# Patient Record
Sex: FEMALE | Race: WHITE | NOT HISPANIC OR LATINO | Employment: OTHER | URBAN - METROPOLITAN AREA
[De-identification: names, ages, dates, MRNs, and addresses within clinical notes are randomized per-mention and may not be internally consistent; named-entity substitution may affect disease eponyms.]

---

## 2017-11-01 ENCOUNTER — HISTORICAL (OUTPATIENT)
Dept: ADMINISTRATIVE | Facility: HOSPITAL | Age: 72
End: 2017-11-01

## 2017-12-19 ENCOUNTER — HISTORICAL (OUTPATIENT)
Dept: RADIOLOGY | Facility: HOSPITAL | Age: 72
End: 2017-12-19

## 2018-01-15 ENCOUNTER — HISTORICAL (OUTPATIENT)
Dept: LAB | Facility: HOSPITAL | Age: 73
End: 2018-01-15

## 2018-01-15 ENCOUNTER — HISTORICAL (OUTPATIENT)
Dept: PREADMISSION TESTING | Facility: HOSPITAL | Age: 73
End: 2018-01-15

## 2018-01-15 LAB
ABS NEUT (OLG): 4.84 X10(3)/MCL (ref 2.1–9.2)
ALBUMIN SERPL-MCNC: 2.8 GM/DL (ref 3.4–5)
ALBUMIN/GLOB SERPL: 0.5 RATIO (ref 1.1–2)
ALP SERPL-CCNC: 126 UNIT/L (ref 38–126)
ALT SERPL-CCNC: 32 UNIT/L (ref 12–78)
APPEARANCE, UA: CLEAR
APTT PPP: 28.3 SECOND(S) (ref 24.8–36.9)
AST SERPL-CCNC: 36 UNIT/L (ref 15–37)
BACTERIA SPEC CULT: ABNORMAL /HPF
BASOPHILS # BLD AUTO: 0.1 X10(3)/MCL (ref 0–0.2)
BASOPHILS NFR BLD AUTO: 1 %
BILIRUB SERPL-MCNC: 0.4 MG/DL (ref 0.2–1)
BILIRUB UR QL STRIP: ABNORMAL
BILIRUBIN DIRECT+TOT PNL SERPL-MCNC: 0.1 MG/DL (ref 0–0.5)
BILIRUBIN DIRECT+TOT PNL SERPL-MCNC: 0.3 MG/DL (ref 0–0.8)
BUN SERPL-MCNC: 24 MG/DL (ref 7–18)
CALCIUM SERPL-MCNC: 8.9 MG/DL (ref 8.5–10.1)
CHLORIDE SERPL-SCNC: 99 MMOL/L (ref 98–107)
CHOLEST SERPL-MCNC: 136 MG/DL (ref 0–200)
CHOLEST/HDLC SERPL: 2.8 {RATIO} (ref 0–4)
CO2 SERPL-SCNC: 30 MMOL/L (ref 21–32)
COLOR UR: ABNORMAL
CREAT SERPL-MCNC: 0.88 MG/DL (ref 0.55–1.02)
EOSINOPHIL # BLD AUTO: 0.3 X10(3)/MCL (ref 0–0.9)
EOSINOPHIL NFR BLD AUTO: 3 %
ERYTHROCYTE [DISTWIDTH] IN BLOOD BY AUTOMATED COUNT: 14.3 % (ref 11.5–17)
EST. AVERAGE GLUCOSE BLD GHB EST-MCNC: 169 MG/DL
FT4I SERPL CALC-MCNC: 3.44 (ref 2.6–3.6)
GLOBULIN SER-MCNC: 6.2 GM/DL (ref 2.4–3.5)
GLUCOSE (UA): NEGATIVE
GLUCOSE SERPL-MCNC: 219 MG/DL (ref 74–106)
HBA1C MFR BLD: 7.5 % (ref 4.2–6.3)
HCT VFR BLD AUTO: 46.1 % (ref 37–47)
HDLC SERPL-MCNC: 49 MG/DL (ref 35–60)
HGB BLD-MCNC: 14.7 GM/DL (ref 12–16)
HGB UR QL STRIP: NEGATIVE
INR PPP: 0.96 (ref 0–1.27)
KETONES UR QL STRIP: NEGATIVE
LDLC SERPL CALC-MCNC: 30 MG/DL (ref 0–129)
LEUKOCYTE ESTERASE UR QL STRIP: ABNORMAL
LYMPHOCYTES # BLD AUTO: 2.9 X10(3)/MCL (ref 0.6–4.6)
LYMPHOCYTES NFR BLD AUTO: 33 %
MCH RBC QN AUTO: 28.1 PG (ref 27–31)
MCHC RBC AUTO-ENTMCNC: 31.9 GM/DL (ref 33–36)
MCV RBC AUTO: 88.1 FL (ref 80–94)
MONOCYTES # BLD AUTO: 0.7 X10(3)/MCL (ref 0.1–1.3)
MONOCYTES NFR BLD AUTO: 8 %
MRSA SCREEN BY PCR: NEGATIVE
NEUTROPHILS # BLD AUTO: 4.84 X10(3)/MCL (ref 1.4–7.9)
NEUTROPHILS NFR BLD AUTO: 55 %
NITRITE UR QL STRIP: NEGATIVE
PH UR STRIP: 6 [PH] (ref 5–9)
PLATELET # BLD AUTO: 296 X10(3)/MCL (ref 130–400)
PMV BLD AUTO: 11.6 FL (ref 9.4–12.4)
POTASSIUM SERPL-SCNC: 3.5 MMOL/L (ref 3.5–5.1)
PROT SERPL-MCNC: 9 GM/DL (ref 6.4–8.2)
PROT UR QL STRIP: ABNORMAL
PROTHROMBIN TIME: 13.1 SECOND(S) (ref 12.2–14.7)
RBC # BLD AUTO: 5.23 X10(6)/MCL (ref 4.2–5.4)
RBC #/AREA URNS HPF: ABNORMAL /[HPF]
SODIUM SERPL-SCNC: 130 MMOL/L (ref 136–145)
SP GR UR STRIP: 1.03 (ref 1–1.03)
SQUAMOUS EPITHELIAL, UA: ABNORMAL
T3RU NFR SERPL: 28 % (ref 31–39)
T4 SERPL-MCNC: 12.3 MCG/DL (ref 4.7–13.3)
TRANSFERRIN SERPL-MCNC: 405 MG/DL (ref 200–360)
TRIGL SERPL-MCNC: 287 MG/DL (ref 30–150)
TSH SERPL-ACNC: 3.4 MIU/ML (ref 0.36–3.74)
UROBILINOGEN UR STRIP-ACNC: 1
VLDLC SERPL CALC-MCNC: 57 MG/DL
WBC # SPEC AUTO: 8.8 X10(3)/MCL (ref 4.5–11.5)
WBC #/AREA URNS HPF: ABNORMAL /[HPF]

## 2018-01-17 LAB — FINAL CULTURE: NO GROWTH

## 2018-12-10 ENCOUNTER — HISTORICAL (OUTPATIENT)
Dept: ADMINISTRATIVE | Facility: HOSPITAL | Age: 73
End: 2018-12-10

## 2019-03-18 ENCOUNTER — HISTORICAL (OUTPATIENT)
Dept: ADMINISTRATIVE | Facility: HOSPITAL | Age: 74
End: 2019-03-18

## 2020-02-11 ENCOUNTER — HISTORICAL (OUTPATIENT)
Dept: ADMINISTRATIVE | Facility: HOSPITAL | Age: 75
End: 2020-02-11

## 2020-05-13 ENCOUNTER — HISTORICAL (OUTPATIENT)
Dept: ADMINISTRATIVE | Facility: HOSPITAL | Age: 75
End: 2020-05-13

## 2021-03-03 ENCOUNTER — HISTORICAL (OUTPATIENT)
Dept: ADMINISTRATIVE | Facility: HOSPITAL | Age: 76
End: 2021-03-03

## 2022-04-10 ENCOUNTER — HISTORICAL (OUTPATIENT)
Dept: ADMINISTRATIVE | Facility: HOSPITAL | Age: 77
End: 2022-04-10

## 2022-04-24 VITALS
DIASTOLIC BLOOD PRESSURE: 81 MMHG | BODY MASS INDEX: 38.35 KG/M2 | WEIGHT: 224.63 LBS | SYSTOLIC BLOOD PRESSURE: 142 MMHG | HEIGHT: 64 IN

## 2022-05-04 NOTE — HISTORICAL [facility]
This is a historical note converted from Fabiana. Formatting and pictures may have been removed.  Please reference Fabiana for original formatting and attached multimedia. Chief Complaint  New patient here wanting to discuss TKR. Xrays today. Overtime pain. Ref Dr. Mendez.  History of Present Illness  She is a pleasant 72-year-old female whose had a long-standing history of?bilateral knee pain but right greater than left. ?The pain in her knee is localized medially but also somewhat global. ?Is worse?with ambulation. ?Somewhat better with rest. ?She is taken?her prescription strength anti-inflammatory medicines. ?Is begin to impact her daily activities.? She has had 3 injections this past year the last of which was in October 2017.? When she is not?responding to injection she will use a cane to ambulate.  Review of Systems  Comprehensive review of system?was performed with no exceptions other than noted in the history of present illness  Physical Exam  Vitals & Measurements  BP:?138/62?  HT:?162?cm? HT:?162?cm? HT:?162?cm? WT:?98?kg? WT:?98?kg? WT:?98?kg? BMI:?37.34?  Gen: WN, WD, NAD  Card/Res: NL breathing, +distal pulses  Abdomen: ND  Standing exam  stance:?Varus alignment, no significant leg-length discrepancy  gait:?Antalgic limp  ?   Knee examination  - General comments: unremarkable appearance  ?   - Tenderness: Medial joint line  ?   Knee??????????RIGHT??????????LEFT  Skin: ??????????Intact ??????????Intact  ROM:??????????20-90??????????0-130  Effusion:??????+????????????? Neg  MJL TTP:??????+????????????? Neg  LJL TTP: ?????? Neg ???????????? Neg  Paloma:???? +????????????? Neg  Pat crep:????????+????????????????Neg  Patella TTPs:? +????????????????Neg  Patella grind: Neg??????????? ?Neg  ?   N-V ????????????????????intact??????????intact  Hip:?????????????????????????nml?????????? nml  ?   Lower extremity edema:Negative  ?  Assessment/Plan  1.?Osteoarthritis of right knee  ? I think she would benefit  from a right total knee arthroplasty. ?We discussed with her briefly the?procedure details and the expected postoperative course.? We will send her to the arthroplasty class. ?I will obtain a CT scan of her right leg for a robotic guidance?in mid December. ?I will see her back in early January for plans for surgical intervention in mid to late January  Ordered:  Clinic Follow up, *Est. 01/01/18 3:00:00 CST, Order for future visit, Osteoarthritis of right knee, [facility]  CT Ext Lower Right Robotic Protocol, Routine, *Est. 11/08/17 3:00:00 CST, Other (please specify), None, Ambulatory, schedule for mid december, Rad Type, Order for future visit, Osteoarthritis of right knee, Schedule this test, [facility], 1, week(s), In Approximately, *Est. 11/08/...  Office/Outpatient Visit Level 3 New [id number] PC, Osteoarthritis of right knee, [facility], 11/01/17 9:52:00 CDT  ?  Orders:  XR Knee Right 3 Views, Routine, 11/01/17 9:24:00 CDT, pain, None, Ambulatory, Rad Type, Pain in right knee, 11/01/17 9:24:00 CDT   Problem List/Past Medical History  Ongoing  Allergic rhinitis  Anxiety disorder  DM - Diabetes mellitus  HTN - Hypertension  Hyperlipidemia  Hypothyroidism  Osteoarthritis of right knee  Historical  Procedure/Surgical History  Appendectomy;  Back  Gallbladder  Hernia  Tubal ligation  Medications  Alprazolam 0.5 Mg Tablet, 0.5 mg= 1 tab(s), Oral, qPM  Amlodipine 5mg, 5 mg= 1 tab(s), Oral, Daily  Diclofenac Sod Ec 75mg, 75 mg= 1 tab(s), Oral, TID  Hctz 25mg, 25 mg= 1 tab(s), Oral, Daily  Levothyroxine Mcg 137, 137 mcg= 1 tab(s), Oral, qAM  potassium chloride 20 mEq oral TABLET extended release, 20 mEq= 1 tab(s), Oral, Daily  Sertraline Hcl 25mg, 25 mg= 1 tab(s), Oral, Daily  Tramadol Hcl 50mg, 50 mg= 1 tab(s), Oral, q6hr  Allergies  Bactrim?(rash)  Codeine Phosphate?(rash)  Social History  Alcohol - 11/01/2017  Never  Substance Abuse - 11/01/2017  Never  Tobacco - 11/01/2017  Never  smoker  Family History  Family history is unknown  Diagnostic Results  Knee radiographs 11/1/2017:?3 views of the knee show advanced arthrosis?bilaterally right greater than left

## 2022-05-04 NOTE — HISTORICAL [facility]
This is a historical note converted from Fabiana. Formatting and pictures may have been removed.  Please reference Fabiana for original formatting and attached multimedia. Chief Complaint  2 month s/p Lt Total Knee Arthroplasty, Sx-3/3/20- Gl-6/1/20. ?States she can feel ?some burning and warm to touch. ?States she has not gone to PT since the Coronavirus problems, ?Denies any swelling. ?Complains of Min pain.  History of Present Illness  3/3/2020: Left total knee arthroplasty  ?   She returns today.? She has occasional scattered pains around the knee. ?She stopped therapy due to the coronavirus but she is done therapy well on her own  Review of Systems  Comprehensive review of system?was performed with no exceptions other than noted in the history of present illness  Physical Exam  Vitals & Measurements  HR:?60(Peripheral)? BP:?142/81?  HT:?162?cm? WT:?101.9?kg? BMI:?38.83?  Gen: WN, WD, NAD  Card/Res: NL breathing, +distal pulses  Abdomen: ND  Standing exam  stance: normal alignment, no significant leg-length discrepancy  gait:?Mild limp  ?   Knee examination  - General comments: unremarkable appearance  ?   - Tenderness: None  ?   Knee??????????RIGHT??????????LEFT  Skin: ??????????Intact ??????????Intact  ROM:??????????0-120??????????0-120  Effusion:????? Neg ???????????? Neg  MJL TTP:????? Neg ???????????? Neg  LJL TTP: ?????? Neg ???????????? Neg  Paloma:? ?Neg ???????????? Neg  Pat crep:?????? Neg ???????????????Neg  Patella TTPs: Neg ???????????????Neg  Patella grind: Neg??????????? ?Neg  Lachman: ?????Neg ????????????????????Neg  Pivot shift: ?????Neg ???????????? Neg  Valgus stress: Neg ???????????????Neg  Varus stress: Neg ???????????????Neg  Posterior drawer: Neg ??????????Neg  ?   N-V ????????????????????intact??????????intact  Hip:?????????????????????????nml?????????? nml  ?   Lower extremity edema:Negative  ?  Assessment/Plan  1.?S/P knee replacement?Z96.659  ?Doing well status post above.? Continue  strengthening. ?I will see her back in 9 months with radiographs of bilateral knees  Ordered:  Clinic Follow up, *Est. 02/13/21 3:00:00 CST, Order for future visit, S/P knee replacement, Orthopaedics  Post-Op follow-up visit [id number] PC, S/P knee replacement, [facility], 05/13/20 15:44:00 CDT  ?  Orders:  XR Knee Left 3 Views, Routine, 05/13/20 15:01:00 CDT, Pain, None, Stretcher, Patient Has IV?, Rad Type, Left knee pain, Not Scheduled, 05/13/20 15:01:00 CDT  Referrals  Clinic Follow up, *Est. 02/13/21 3:00:00 CST, Order for future visit, S/P knee replacement, Orthopaedics   Problem List/Past Medical History  Ongoing  Allergic rhinitis  Anxiety disorder  DM - Diabetes mellitus  Gastric reflux  HTN - Hypertension  Hypothyroidism  S/P knee replacement  Historical  No qualifying data  Procedure/Surgical History  [facility] Total Knee Arthroplasty (Left) (03/03/2020)  Replacement of Left Knee Joint with Synthetic Substitute, Uncemented, Open Approach (03/03/2020)  [facility] Total Knee Arthroplasty (Right) (03/13/2018)  Replacement of Right Knee Joint with Synthetic Substitute, Open Approach (03/13/2018)  Appendectomy;  Back  Gallbladder  Hernia  Tubal ligation   Medications  Alprazolam 0.5 Mg Tablet, 0.5 mg= 1 tab(s), Oral, qPM  Amlodipine 5mg, 5 mg= 1 tab(s), Oral, Daily  aspirin 81 mg oral Delayed Release (EC) tablet, 81 mg= 1 tab(s), Oral, BID,? ?Not taking: Last Dose Date/Time Unknown  aspirin 81 mg oral tablet, 81 mg= 1 tab(s), Oral, Daily  benzonatate 100 mg oral capsule, 100 mg= 1 cap(s), Oral, TID, PRN  diclofenac sodium 75 mg oral delayed release tablet, 75 mg= 1 tab(s), Oral, BID  Hctz 25mg, 25 mg= 1 tab(s), Oral, Daily  Januvia 100 mg oral tablet, 100 mg= 1 tab(s), Oral, Daily  levothyroxine 150 mcg (0.15 mg) oral tablet, 150 mcg= 1 tab(s), Oral, Daily  magnesium oxide 400 mg oral tablet, 400 mg= 1 tab(s), Oral, Daily  Pantoprazole 40 mg ORAL EC-Tablet, 40 mg= 1 tab(s), Oral, BID  Sertraline Hcl  25mg, 25 mg= 1 tab(s), Oral, Daily  Vitamin D2 50,000 intl units oral capsule, 29331 IntUnit= 1 cap(s), Oral, qWeek  Allergies  Adhesive tape?(welts)  Bactrim?(rash)  Codeine Phosphate?(rash)  Social History  Abuse/Neglect  No, 05/13/2020  Alcohol  Never, 11/01/2017  Substance Use  Never, 11/01/2017  Tobacco  Never (less than 100 in lifetime), No, 05/13/2020  Family History  Acute myocardial infarction.: Mother.  Asthma.: Mother.  Coronary artery disease: Mother.  Hypertension.: Mother.  Thyroid disorder: Father.  Health Maintenance  Health Maintenance  ???Pending?(in the next year)  ??? ??OverDue  ??? ? ? ?Coronary Artery Disease Maintenance-Antiplatelet Agent Prescribed due??and every?  ??? ? ? ?Advance Directive due??01/01/20??and every 1??year(s)  ??? ? ? ?Geriatric Depression Screening due??01/01/20??and every 1??year(s)  ??? ??Due?  ??? ? ? ?Bone Density Screening due??05/13/20??Variable frequency  ??? ? ? ?Colorectal Screening ([facility]) due??05/13/20??and every?  ??? ? ? ?Coronary Artery Disease Maintenance-Lipid Lowering Therapy due??05/13/20??and every?  ??? ? ? ?Diabetes Maintenance-Eye Exam due??05/13/20??and every?  ??? ? ? ?Diabetes Maintenance-Foot Exam due??05/13/20??and every?  ??? ? ? ?Diabetes Maintenance-Microalbumin due??05/13/20??Variable frequency  ??? ? ? ?Hypertension Management-Education due??05/13/20??and every 1??year(s)  ??? ? ? ?Medicare Annual Wellness Exam due??05/13/20??and every 1??year(s)  ??? ? ? ?Pneumococcal Vaccine due??05/13/20??Variable frequency  ??? ? ? ?Pneumococcal Vaccine due??05/13/20??and every?  ??? ? ? ?Tetanus Vaccine due??05/13/20??and every 10??year(s)  ??? ? ? ?Zoster Vaccine due??05/13/20??and every 100??year(s)  ??? ??Due In Future?  ??? ? ? ?Alcohol Misuse Screening not due until??01/01/21??and every 1??year(s)  ??? ? ? ?Cognitive Screening not due until??01/01/21??and every 1??year(s)  ??? ? ? ?Fall Risk Assessment not due until??01/01/21??and every  1??year(s)  ??? ? ? ?Functional Assessment not due until??01/01/21??and every 1??year(s)  ??? ? ? ?Obesity Screening not due until??01/01/21??and every 1??year(s)  ??? ? ? ?Diabetes Maintenance-HgbA1c not due until??01/16/21??and every 1??year(s)  ??? ? ? ?Diabetes Maintenance-Fasting Lipid Profile not due until??01/16/21??and every 1??year(s)  ??? ? ? ?Hypertension Management-Blood Pressure not due until??02/10/21??and every 1??year(s)  ??? ? ? ?Diabetes Maintenance-Urine Dipstick not due until??02/11/21??and every 1??year(s)  ??? ? ? ?Hypertension Management-BMP not due until??03/05/21??and every 1??year(s)  ??? ? ? ?Diabetes Maintenance-Serum Creatinine not due until??03/05/21??and every 1??year(s)  ??? ? ? ?Aspirin Therapy for CVD Prevention not due until??03/06/21??and every 1??year(s)  ??? ? ? ?Smoking Cessation (Diabetes) not due until??03/17/21??and every 2??year(s)  ??? ? ? ?Smoking Cessation (Coronary Artery Disease) not due until??03/17/21??and every 2??year(s)  ??? ? ? ?ADL Screening not due until??03/20/21??and every 1??year(s)  ???Satisfied?(in the past 1 year)  ??? ??Satisfied?  ??? ? ? ?ADL Screening on??03/20/20.??Satisfied by Beatriz Akbar  ??? ? ? ?Alcohol Misuse Screening on??03/20/20.??Satisfied by Beatriz Akbar  ??? ? ? ?Aspirin Therapy for CVD Prevention on??03/06/20.??Satisfied by Ju Mccoy RN  ??? ? ? ?Blood Pressure Screening on??05/13/20.??Satisfied by Diamond Brown  ??? ? ? ?Body Mass Index Check on??05/13/20.??Satisfied by Diamond Brown  ??? ? ? ?Breast Cancer Screening ([facility]) on??07/31/19.??Satisfied by Theunissen RT, Angela  ??? ? ? ?Coronary Artery Disease Maintenance-Antiplatelet Agent Prescribed on??03/06/20.??Satisfied by Shikha Stringer NP  ??? ? ? ?Diabetes Maintenance-Serum Creatinine on??03/05/20.??Satisfied by Maryam Meyer  ??? ? ? ?Diabetes Screening on??03/05/20.??Satisfied by Maryam Meyer  ??? ? ? ?Fall Risk Assessment  on??03/20/20.??Satisfied by Beatriz Akbar  ??? ? ? ?Functional Assessment on??03/06/20.??Satisfied by Ju Mccoy RN  ??? ? ? ?Hypertension Management-Blood Pressure on??05/13/20.??Satisfied by Diamond Brown  ??? ? ? ?Lipid Screening on??01/17/20.??Satisfied by Nissa Goncalves  ??? ? ? ?Obesity Screening on??05/13/20.??Satisfied by Diamond Brown  ?  Diagnostic Results  Knee radiographs show appropriate implant position

## 2022-05-04 NOTE — HISTORICAL [facility]
This is a historical note converted from Fabiana. Formatting and pictures may have been removed.  Please reference Fabiana for original formatting and attached multimedia. Chief Complaint  9 month s/p Right TKR doing good. Also LT knee pain wants inj. Xrays today. sx 03/13/2018.  History of Present Illness  3/13/2018: Right total knee arthroplasty  ?   She returns today. Her right knee is done good for her. ?She not having any pain. ?She is noticed increased pain over the left knee with some catching within it.  Review of Systems  Comprehensive review of system?was performed with no exceptions other than noted in the history of present illness  Physical Exam  Vitals & Measurements  HT:?164?cm? HT:?164?cm? HT:?164?cm? WT:?96.9?kg? WT:?96.9?kg? WT:?96.9?kg? BMI:?36.03?  Gen: WN, WD, NAD  Card/Res: NL breathing, +distal pulses  Abdomen: ND  Standing exam  stance: normal alignment, no significant leg-length discrepancy  gait:?antalgic limp  ?   Knee examination  - General comments: unremarkable appearance  ?   - Tenderness: Left side medial joint line  ?   Knee??????????RIGHT??????????LEFT  Skin: ??????????Intact ??????????Intact  ROM:??????????0-120??????????  Effusion:????? Neg????????????? +  MJL TTP:????? Neg????????????? +  LJL TTP: ?????? Neg ???????????? Neg  Paloma:? ?Neg??????????????? +  Pat crep:?????? Neg????????????????+  Patella TTPs: Neg????????????????+  Patella grind: Neg??????????? ?Neg  Lachman: ?????Neg ????????????????????Neg  Pivot shift: ?????Neg ???????????? Neg  Valgus stress: Neg ???????????????Neg  Varus stress: Neg ???????????????Neg  Posterior drawer: Neg ??????????Neg  ?   N-V ????????????????????intact??????????intact  Hip:?????????????????????????nml?????????? nml  ?   Lower extremity edema:Negative  ?  Assessment/Plan  1.?Osteoarthritis of left knee?M17.12  ? We will try an injection today.  ?  Risks of cortisone were discussed with the patient including hypopigmentation  subcutaneous fat atrophy, and post injection pain flare. The patient understood these risks and requested to proceed. The left knee was prepped with betadine. The 1cc of steroid and 3cc of lidocaine injection was administered under sterile techinique. The injection was administered in clinic by me, Jerman Hall, and the patient tolerated the procedure well.  ?  Ordered:  betamethasone, 12 mg, Intra-Articular, Once, first dose 12/10/18 11:00:00 CST, stop date 12/10/18 11:00:00 CST  Lidocaine inj., 2 mL, Intra-Articular, Once, first dose 12/10/18 10:49:00 CST, stop date 12/10/18 10:49:00 CST  asp/inj jnt/bursa, major [id number] PC, 12/10/18 10:49:00 CST, [facility], Routine, 12/10/18 10:49:00 CST  Clinic Follow up, *Est. 03/10/19 3:00:00 CDT, Order for future visit, Osteoarthritis of left knee  S/P knee replacement, [facility]  Office/Outpatient Visit Level 3 Established [id number] PC, Osteoarthritis of left knee  S/P knee replacement, [facility], 12/10/18 10:49:00 CST  ?  S/P knee replacement?Z96.659  Doing well status post above. ?I will see her back in 3 months for her final?radiographs.  Ordered:  Clinic Follow up, *Est. 03/10/19 3:00:00 CDT, Order for future visit, Osteoarthritis of left knee  S/P knee replacement, [facility]  Office/Outpatient Visit Level 3 Established [id number] PC, Osteoarthritis of left knee  S/P knee replacement, [facility], 12/10/18 10:49:00 CST  XR Knee Right 4 or More Views, Routine, 12/10/18 10:22:00 CST, Post-Op, None, Ambulatory, Rad Type, S/P knee replacement, [id number], 12/10/18 10:22:00 CST  ?  Orders:  XR Knee Left 4 or More Views, Routine, 12/10/18 10:28:00 CST, pain, None, Ambulatory, Rad Type, Pain in left knee, Not Scheduled, 12/10/18 10:28:00 CST   Problem List/Past Medical History  Ongoing  Allergic rhinitis  Anxiety disorder  DM - Diabetes mellitus  Gastric reflux  HTN - Hypertension  Hypothyroidism  Osteoarthritis of left knee  S/P knee replacement  Historical  No  qualifying data  Procedure/Surgical History  [facility] Total Knee Arthroplasty (Right) (03/13/2018)  Appendectomy;  Back  Gallbladder  Hernia  Tubal ligation   Medications  Alprazolam 0.5 Mg Tablet, 0.5 mg= 1 tab(s), Oral, qPM  Amlodipine 5mg, 5 mg= 1 tab(s), Oral, Daily  aspirin 81 mg oral tablet, 81 mg= 1 tab(s), Oral, Daily  Celestone, 12 mg, Intra-Articular, Once  Ciprofloxacin Hcl 500 Mg Tab, 500 mg= 1 tab(s), Oral, BID,? ?Not Taking, Completed Rx  Diclofenac Sod Ec 75mg, 75 mg= 1 tab(s), Oral, TID  Diphenoxylate-Atropine 2.5-0.025 Mg,? ?Not Taking, Completed Rx  Ecotrin 325 mg oral enteric coated tablet, 325 mg= 1 tab(s), Oral, Daily  fluticasone 50 mcg/inh nasal spray, 1 spray(s), Nasal, Once  glyBURIDE 2.5 mg oral tablet, 2.5 mg= 1 tab(s), Oral, BID  Hctz 25mg, 25 mg= 1 tab(s), Oral, Daily  Levothyroxine Mcg 137, 137 mcg= 1 tab(s), Oral, qAM  lidocaine 2% injectable solution, 2 mL, Intra-Articular, Once  Prilosec 20 mg oral DR capsule, 20 mg= 1 cap(s), Oral, Daily  Sertraline Hcl 25mg, 25 mg= 1 tab(s), Oral, Daily  Tramadol Hcl 50mg, 50 mg= 1 tab(s), Oral, q6hr  Allergies  Adhesive tape?(welts)  Bactrim?(rash)  Codeine Phosphate?(rash)  Social History  Alcohol  Never, 11/01/2017  Substance Abuse  Never, 11/01/2017  Tobacco  Never smoker, No, 12/10/2018  Never smoker, 11/01/2017  Family History  Acute myocardial infarction.: Mother.  Asthma.: Mother.  Coronary artery disease: Mother.  Hypertension.: Mother.  Thyroid disorder: Father.  Health Maintenance  Health Maintenance  ???Pending?(in the next year)  ??? ??Due?  ??? ? ? ?ADL Screening due??12/10/18??and every 1??year(s)  ??? ? ? ?Alcohol Misuse Screening due??12/10/18??and every 1??year(s)  ??? ? ? ?Bone Density Screening due??12/10/18??Variable frequency  ??? ? ? ?Breast Cancer Screening ([facility]) due??12/10/18??and every?  ??? ? ? ?Cognitive Screening due??12/10/18??and every 1??year(s)  ??? ? ? ?Colorectal Screening ([facility])  due??12/10/18??and every?  ??? ? ? ?Diabetes Maintenance-Eye Exam due??12/10/18??and every?  ??? ? ? ?Diabetes Maintenance-Foot Exam due??12/10/18??and every?  ??? ? ? ?Diabetes Maintenance-Microalbumin due??12/10/18??Variable frequency  ??? ? ? ?Functional Assessment due??12/10/18??and every 1??year(s)  ??? ? ? ?Geriatric Depression Screening due??12/10/18??and every 1??year(s)  ??? ? ? ?Hypertension Management-Education due??12/10/18??and every 1??year(s)  ??? ? ? ?Pneumococcal Vaccine due??12/10/18??Variable frequency  ??? ? ? ?Pneumococcal Vaccine due??12/10/18??and every?  ??? ? ? ?Smoking Cessation due??12/10/18??Variable frequency  ??? ? ? ?Tetanus Vaccine due??12/10/18??and every 10??year(s)  ??? ? ? ?Zoster Vaccine due??12/10/18??and every 100??year(s)  ??? ??Due In Future?  ??? ? ? ?Advance Directive not due until??01/15/19??and every 1??year(s)  ??? ? ? ?Diabetes Maintenance-Urine Dipstick not due until??03/13/19??and every 1??year(s)  ??? ? ? ?Aspirin Therapy for CVD Prevention not due until??03/16/19??and every 1??year(s)  ??? ? ? ?Diabetes Maintenance-Fasting Lipid Profile not due until??06/12/19??and every 1??year(s)  ??? ? ? ?Hypertension Management-BMP not due until??06/12/19??and every 1??year(s)  ??? ? ? ?Diabetes Maintenance-Serum Creatinine not due until??06/12/19??and every 1??year(s)  ??? ? ? ?Diabetes Maintenance-HgbA1c not due until??10/08/19??and every 1??year(s)  ??? ? ? ?Hypertension Management-Blood Pressure not due until??10/08/19??and every 1??year(s)  ???Satisfied?(in the past 1 year)  ??? ??Satisfied?  ??? ? ? ?Advance Directive on??01/15/18.??Satisfied by Lesley Raymond RN  ??? ? ? ?Aspirin Therapy for CVD Prevention on??03/16/18.??Satisfied by Jane Valadez RN  ??? ? ? ?Blood Pressure Screening on??10/08/18.??Satisfied by Tori Denton L. L.  ??? ? ? ?Body Mass Index Check on??12/10/18.??Satisfied by Tori Denton L. L.  ??? ? ? ?Depression Screening on??12/10/18.??Satisfied  by Tori Denton L. L.  ??? ? ? ?Diabetes Maintenance-Fasting Lipid Profile on??06/12/18.??Satisfied by Nissa Goncalves  ??? ? ? ?Diabetes Maintenance-HgbA1c on??06/12/18.??Satisfied by Nissa Goncalves  ??? ? ? ?Diabetes Maintenance-Serum Creatinine on??06/12/18.??Satisfied by Nissa Goncalves  ??? ? ? ?Diabetes Maintenance-Urine Dipstick on??03/13/18.??Satisfied by Nubia [place] MT.  ??? ? ? ?Diabetes Screening on??06/12/18.??Satisfied by Nissa Goncalves  ??? ? ? ?Fall Risk Assessment on??12/10/18.??Satisfied by Tori Denton L. L.  ??? ? ? ?Hypertension Management-Blood Pressure on??10/08/18.??Satisfied by Tori Denton L. L.  ??? ? ? ?Hypertension Management-BMP on??06/12/18.??Satisfied by Nissa Goncalves  ??? ? ? ?Influenza Vaccine on??10/08/18.??Satisfied by Tori Denton L. L.  ??? ? ? ?Lipid Screening on??06/12/18.??Satisfied by Nissa Goncalves  ??? ? ? ?Obesity Screening on??12/10/18.??Satisfied by Tori Denton L. L.  ?  ?  Diagnostic Results  Knee radiographs 12/10/2018:?4 views of bilateral knees show advanced arthrosis on the left with appropriate implant position on the right     [1]?Office Visit Note; Rafael GO MD, Jerman RUIZ 10/08/2018 11:16 CDT

## 2022-05-04 NOTE — HISTORICAL [facility]
This is a historical note converted from Fabiana. Formatting and pictures may have been removed.  Please reference Fabiana for original formatting and attached multimedia. Chief Complaint  1 yr s/p Rt Total Knee, Sx--3/13/18, states she has no pain, She states she has pain in left knee that comes and go, states she would like an injection.  History of Present Illness  3/13/2018: Right total knee arthroplasty  ?   She returns today. Her right knee is done good for her. ?She not having any pain. ?She is noticed increased pain over the left knee with some catching within it. [1]  Review of Systems  Comprehensive review of system?was performed with no exceptions other than noted in the history of present illness [2]  Physical Exam  Vitals & Measurements  HR:?76(Peripheral)? BP:?145/84?  HT:?164?cm? WT:?96.9?kg? BMI:?36.03?  Gen: WN, WD, NAD  Card/Res: NL breathing, +distal pulses  Abdomen: ND  Standing exam  stance: normal alignment, no significant leg-length discrepancy  gait:?antalgic limp  ?   Knee examination  - General comments: unremarkable appearance  ?   - Tenderness: Left side medial joint line  ?   Knee??????????RIGHT??????????LEFT  Skin: ??????????Intact ??????????Intact  ROM:??????????0-120??????????  Effusion:????? Neg????????????? +  MJL TTP:????? Neg????????????? +  LJL TTP: ?????? Neg ???????????? Neg  Paloma:? ?Neg??????????????? +  Pat crep:?????? Neg????????????????+  Patella TTPs: Neg????????????????+  Patella grind: Neg??????????? ?Neg  Lachman: ?????Neg ????????????????????Neg  Pivot shift: ?????Neg ???????????? Neg  Valgus stress: Neg ???????????????Neg  Varus stress: Neg ???????????????Neg  Posterior drawer: Neg ??????????Neg  ?   N-V ????????????????????intact??????????intact  Hip:?????????????????????????nml?????????? nml  ?   Lower extremity edema:Negative [3]  Assessment/Plan  1.?Osteoarthritis of left knee?M17.12  ? We will try repeat injection today. ?She like to get a total knee  arthroplasty in?the fall 2019  ?  Risks of cortisone were discussed with the patient including hypopigmentation subcutaneous fat atrophy, and post injection pain flare. The patient understood these risks and requested to proceed. The left knee was prepped with betadine. The 1cc of steroid and 3cc of lidocaine injection was administered under sterile techinique. The injection was administered in clinic by me, Jerman Hall, and the patient tolerated the procedure well.  ?  Ordered:  dexamethasone, 4 mg, Intra-Articular, Once, first dose 03/18/19 11:41:00 CDT, stop date 03/18/19 11:41:00 CDT  Lidocaine inj., 2 mL, Intra-Articular, Once, first dose 03/18/19 11:41:00 CDT, stop date 03/18/19 11:41:00 CDT  asp/inj jnt/bursa, major [id number] PC, 03/18/19 11:41:00 CDT, [facility], Routine, 03/18/19 11:41:00 CDT  Clinic Follow up, *Est. 07/18/19 3:00:00 CDT, Order for future visit, Osteoarthritis of left knee  S/P knee replacement, [facility]  Office/Outpatient Visit Level 3 Established [id number] PC, Osteoarthritis of left knee  S/P knee replacement, [facility], 03/18/19 11:41:00 CDT  ?  2.?S/P knee replacement?Z96.659  ? Doing well status post above  Ordered:  Clinic Follow up, *Est. 07/18/19 3:00:00 CDT, Order for future visit, Osteoarthritis of left knee  S/P knee replacement, [facility]  Office/Outpatient Visit Level 3 Established [id number] PC, Osteoarthritis of left knee  S/P knee replacement, [facility], 03/18/19 11:41:00 CDT  ?  Orders:  XR Knee Right 4 or More Views, Routine, 03/18/19 11:12:00 CDT, Pain, None, Ambulatory, Patient Has IV?, Rad Type, Right knee pain, Not Scheduled, 03/18/19 11:12:00 CDT   Problem List/Past Medical History  Ongoing  Allergic rhinitis  Anxiety disorder  DM - Diabetes mellitus  Gastric reflux  HTN - Hypertension  Hypothyroidism  Osteoarthritis of left knee  S/P knee replacement  Historical  No qualifying data  Procedure/Surgical History  [facility] Total Knee  Arthroplasty (Right) (03/13/2018)  Appendectomy;  Back  Gallbladder  Hernia  Tubal ligation   Medications  Alprazolam 0.5 Mg Tablet, 0.5 mg= 1 tab(s), Oral, qPM  Amlodipine 5mg, 5 mg= 1 tab(s), Oral, Daily  aspirin 81 mg oral tablet, 81 mg= 1 tab(s), Oral, Daily  dexamethasone, 4 mg, Intra-Articular, Once  Diclofenac Sod Ec 75mg, 75 mg= 1 tab(s), Oral, TID  Hctz 25mg, 25 mg= 1 tab(s), Oral, Daily  Levothyroxine Mcg 137, 137 mcg= 1 tab(s), Oral, qAM  lidocaine 2% injectable solution, 2 mL, Intra-Articular, Once  Prilosec 20 mg oral DR capsule, 20 mg= 1 cap(s), Oral, Daily  Sertraline Hcl 25mg, 25 mg= 1 tab(s), Oral, Daily  Tramadol Hcl 50mg, 50 mg= 1 tab(s), Oral, q6hr,? ?Not taking  Zofran ODT 8 mg oral tablet, disintegrating, 8 mg= 1 tab(s), Oral, TID, PRN,? ?Not taking  Allergies  Adhesive tape?(welts)  Bactrim?(rash)  Codeine Phosphate?(rash)  Social History  Alcohol  Never, 11/01/2017  Substance Abuse  Never, 11/01/2017  Tobacco  Never (less than 100 in lifetime), No, 03/18/2019  Never (less than 100 in lifetime), N/A, 01/15/2019  Never smoker, No, 12/10/2018  Never smoker, 11/01/2017  Family History  Acute myocardial infarction.: Mother.  Asthma.: Mother.  Coronary artery disease: Mother.  Hypertension.: Mother.  Thyroid disorder: Father.  Health Maintenance  Health Maintenance  ???Pending?(in the next year)  ??? ??OverDue  ??? ? ? ?Advance Directive due??01/15/19??and every 1??year(s)  ??? ? ? ?Diabetes Maintenance-Urine Dipstick due??03/13/19??and every 1??year(s)  ??? ??Due?  ??? ? ? ?Aspirin Therapy for CVD Prevention due??03/16/19??and every 1??year(s)  ??? ? ? ?ADL Screening due??03/18/19??and every 1??year(s)  ??? ? ? ?Alcohol Misuse Screening due??03/18/19??and every 1??year(s)  ??? ? ? ?Bone Density Screening due??03/18/19??Variable frequency  ??? ? ? ?Breast Cancer Screening ([facility]) due??03/18/19??and every?  ??? ? ? ?Cognitive Screening due??03/18/19??and every 1??year(s)  ??? ? ?  ?Colorectal Screening ([facility]) due??03/18/19??and every?  ??? ? ? ?Diabetes Maintenance-Eye Exam due??03/18/19??and every?  ??? ? ? ?Diabetes Maintenance-Foot Exam due??03/18/19??and every?  ??? ? ? ?Diabetes Maintenance-Medication Prescribed due??03/18/19??and every 1??year(s)  ??? ? ? ?Diabetes Maintenance-Microalbumin due??03/18/19??Variable frequency  ??? ? ? ?Functional Assessment due??03/18/19??and every 1??year(s)  ??? ? ? ?Geriatric Depression Screening due??03/18/19??and every 1??year(s)  ??? ? ? ?Hypertension Management-Education due??03/18/19??and every 1??year(s)  ??? ? ? ?Pneumococcal Vaccine due??03/18/19??Variable frequency  ??? ? ? ?Pneumococcal Vaccine due??03/18/19??and every?  ??? ? ? ?Smoking Cessation due??03/18/19??Variable frequency  ??? ? ? ?Tetanus Vaccine due??03/18/19??and every 10??year(s)  ??? ? ? ?Zoster Vaccine due??03/18/19??and every 100??year(s)  ??? ??Due In Future?  ??? ? ? ?Diabetes Maintenance-Fasting Lipid Profile not due until??06/12/19??and every 1??year(s)  ??? ? ? ?Hypertension Management-BMP not due until??06/12/19??and every 1??year(s)  ??? ? ? ?Diabetes Maintenance-Serum Creatinine not due until??06/12/19??and every 1??year(s)  ??? ? ? ?Hypertension Management-Blood Pressure not due until??10/08/19??and every 1??year(s)  ??? ? ? ?Diabetes Maintenance-HgbA1c not due until??12/10/19??and every 1??year(s)  ??? ? ? ?Fall Risk Assessment not due until??01/15/20??and every 1??year(s)  ???Satisfied?(in the past 1 year)  ??? ??Satisfied?  ??? ? ? ?Blood Pressure Screening on??03/18/19.??Satisfied by Diamond Brown  ??? ? ? ?Body Mass Index Check on??03/18/19.??Satisfied by Diamond Brown  ??? ? ? ?Depression Screening on??12/10/18.??Satisfied by Tori Denton  ??? ? ? ?Diabetes Maintenance-Fasting Lipid Profile on??06/12/18.??Satisfied by Nissa Goncalves  ??? ? ? ?Diabetes Maintenance-HgbA1c on??06/12/18.??Satisfied by Nissa Goncalves  ??? ? ?  ?Diabetes Maintenance-Serum Creatinine on??06/12/18.??Satisfied by Nissa Goncalves  ??? ? ? ?Diabetes Screening on??06/12/18.??Satisfied by Nissa Goncalves  ??? ? ? ?Fall Risk Assessment on??01/15/19.??Satisfied by Kori Roy RN  ??? ? ? ?Hypertension Management-Blood Pressure on??03/18/19.??Satisfied by Diamond Brown  ??? ? ? ?Hypertension Management-BMP on??06/12/18.??Satisfied by Nissa Goncalves  ??? ? ? ?Influenza Vaccine on??10/08/18.??Satisfied by Tori Denton  ??? ? ? ?Lipid Screening on??06/12/18.??Satisfied by Nissa Goncalves  ??? ? ? ?Obesity Screening on??03/18/19.??Satisfied by Diamond Brown  ?  ?  Diagnostic Results  Knee radiographs 3/18/2019: 4 views of the right knee show appropriate implant position     [1]?Office Visit Note; Jerman Hall JR., MD 12/10/2018 10:50 CST  [2]?Office Visit Note; Jerman Hall JR., MD 12/10/2018 10:50 CST  [3]?Office Visit Note; Jerman Hall JR., MD 12/10/2018 10:50 CST

## 2022-05-04 NOTE — HISTORICAL [facility]
This is a historical note converted from Fabiana. Formatting and pictures may have been removed.  Please reference Fabiana for original formatting and attached multimedia. Chief Complaint  1 year s/p LT TKR w/ Xrays. Doing great. ?sx 3/3/20. Unable to obtain BP.  History of Present Illness  3/13/2018: Right total knee arthroplasty  3/3/2020: Left total knee arthroplasty  ?   She returns today.? She is doing great. ?She is not having any trouble with the knees. ?She is happy with both results  Review of Systems  Comprehensive review of system?was performed with no exceptions other than noted in the history of present illness  Physical Exam  Vitals & Measurements  HT:?162.00?cm? WT:?101.900?kg? BMI:?38.83?  Gen: WN, WD, NAD  Card/Res: NL breathing, +distal pulses  Abdomen: ND  Standing exam  stance: normal alignment, no significant leg-length discrepancy  gait: no limp  ?   Knee examination  - General comments: unremarkable appearance  ?   - Tenderness: None  ?   Knee??????????RIGHT??????????LEFT  Skin: ??????????Intact ??????????Intact  ROM:??????????0-120??????????0-130  Effusion:????? Neg ???????????? Neg  MJL TTP:????? Neg ???????????? Neg  LJL TTP: ?????? Neg ???????????? Neg  Paloma:? ?Neg ???????????? Neg  Pat crep:?????? Neg ???????????????Neg  Patella TTPs: Neg ???????????????Neg  Patella grind: Neg??????????? ?Neg  Lachman: ?????Neg ????????????????????Neg  Pivot shift: ?????Neg ???????????? Neg  Valgus stress: Neg ???????????????Neg  Varus stress: Neg ???????????????Neg  Posterior drawer: Neg ??????????Neg  ?   N-V ????????????????????intact??????????intact  Hip:?????????????????????????nml?????????? nml  ?   Lower extremity edema:Negative  ?  Assessment/Plan  1.?S/P knee replacement?Z96.659  ?Doing great status post above. ?Activities as tolerated. ?I will see her back in 1 year with radiographs of bilateral knees  Ordered:  Clinic Follow up, *Est. 03/03/22 3:00:00 CST, Order for future visit, S/P knee  replacement, [facility]  Office/Outpatient Visit Level 3 Established [id number] PC, S/P knee replacement, [facility], 03/03/21 14:31:00 CST  XR Knee Left 3 Views, Routine, 03/03/21 14:05:00 CST, None, Stretcher, Patient Has IV?, Rad Type, S/P knee replacement, Not Scheduled, 03/03/21 14:05:00 CST  XR Knee Right 3 Views, Routine, 03/03/21 14:05:00 CST, None, Stretcher, Patient Has IV?, Rad Type, S/P knee replacement, Not Scheduled, 03/03/21 14:05:00 CST  ?  Referrals  Clinic Follow up, *Est. 03/03/22 3:00:00 CST, Order for future visit, S/P knee replacement, [facility]   Problem List/Past Medical History  Ongoing  Allergic rhinitis  Anxiety disorder  DM - Diabetes mellitus  Gastric reflux  HTN - Hypertension  Hypothyroidism  S/P knee replacement  Historical  No qualifying data  Procedure/Surgical History  [facility] Total Knee Arthroplasty (Left) (03/03/2020)  Replacement of Left Knee Joint with Synthetic Substitute, Uncemented, Open Approach (03/03/2020)  [facility] Total Knee Arthroplasty (Right) (03/13/2018)  Replacement of Right Knee Joint with Synthetic Substitute, Open Approach (03/13/2018)  Appendectomy;  Back  Gallbladder  Hernia  Tubal ligation   Medications  Alprazolam 0.5 Mg Tablet, 0.5 mg= 1 tab(s), Oral, qPM  Amlodipine 5mg, 5 mg= 1 tab(s), Oral, Daily  aspirin 81 mg oral Delayed Release (EC) tablet, 81 mg= 1 tab(s), Oral, BID,? ?Not taking: Last Dose Date/Time Unknown  aspirin 81 mg oral tablet, 81 mg= 1 tab(s), Oral, Daily  benzonatate 100 mg oral capsule, 100 mg= 1 cap(s), Oral, TID, PRN  diclofenac sodium 75 mg oral delayed release tablet, 75 mg= 1 tab(s), Oral, BID,? ?Not taking: Last Dose Date/Time unknown  Hctz 25mg, 25 mg= 1 tab(s), Oral, Daily  Januvia 100 mg oral tablet, 100 mg= 1 tab(s), Oral, Daily  levothyroxine 150 mcg (0.15 mg) oral tablet, 150 mcg= 1 tab(s), Oral, Daily  magnesium oxide 400 mg oral tablet, 400 mg= 1 tab(s), Oral, Daily  Pantoprazole 40 mg ORAL EC-Tablet, 40 mg=  1 tab(s), Oral, BID  Sertraline Hcl 25mg, 25 mg= 1 tab(s), Oral, Daily  Vitamin D2 50,000 intl units oral capsule, 53244 IntUnit= 1 cap(s), Oral, qWeek  Allergies  Adhesive tape?(welts)  Bactrim?(rash)  Codeine Phosphate?(rash)  Social History  Abuse/Neglect  No, No, Yes, 03/03/2021  Alcohol  Never, 11/01/2017  Substance Use  Never, 11/01/2017  Tobacco  Never (less than 100 in lifetime), No, 03/03/2021  Family History  Acute myocardial infarction.: Mother.  Asthma.: Mother.  Coronary artery disease: Mother.  Hypertension.: Mother.  Thyroid disorder: Father.  Health Maintenance  Health Maintenance  ???Pending?(in the next year)  ??? ??OverDue  ??? ? ? ?Depression Screening due??12/10/19??and every 1??year(s)  ??? ? ? ?Influenza Vaccine due??10/01/20??and every 1??day(s)  ??? ? ? ?Advance Directive due??01/02/21??and every 1??year(s)  ??? ? ? ?Cognitive Screening due??01/02/21??and every 1??year(s)  ??? ? ? ?Functional Assessment due??01/02/21??and every 1??year(s)  ??? ??Due?  ??? ? ? ?Bone Density Screening due??03/03/21??Variable frequency  ??? ? ? ?Coronary Artery Disease Maintenance-Lipid Lowering Therapy due??03/03/21??Unknown Frequency  ??? ? ? ?Diabetes Maintenance-Eye Exam due??03/03/21??Unknown Frequency  ??? ? ? ?Diabetes Maintenance-Foot Exam due??03/03/21??Unknown Frequency  ??? ? ? ?Hypertension Management-Education due??03/03/21??and every 1??year(s)  ??? ? ? ?Medicare Annual Wellness Exam due??03/03/21??and every 1??year(s)  ??? ? ? ?Pneumococcal Vaccine due??03/03/21??Unknown Frequency  ??? ? ? ?Tetanus Vaccine due??03/03/21??and every 10??year(s)  ??? ? ? ?Zoster Vaccine due??03/03/21??Unknown Frequency  ??? ??Due In Future?  ??? ? ? ?Aspirin Therapy for CVD Prevention not due until??03/06/21??and every 1??year(s)  ??? ? ? ?ADL Screening not due until??03/20/21??and every 1??year(s)  ??? ? ? ?Blood Pressure Screening not due until??05/13/21??and every 1??year(s)  ??? ? ? ?Body Mass Index Check not  due until??05/13/21??and every 1??year(s)  ??? ? ? ?Hypertension Management-Blood Pressure not due until??05/13/21??and every 1??year(s)  ??? ? ? ?Diabetes Maintenance-HgbA1c not due until??10/05/21??and every 1??year(s)  ??? ? ? ?Hypertension Management-BMP not due until??10/05/21??and every 1??year(s)  ??? ? ? ?Diabetes Maintenance-Serum Creatinine not due until??10/05/21??and every 1??year(s)  ??? ? ? ?Diabetes Maintenance-Fasting Lipid Profile not due until??10/05/21??and every 1??year(s)  ??? ? ? ?Obesity Screening not due until??01/01/22??and every 1??year(s)  ??? ? ? ?Fall Risk Assessment not due until??01/02/22??and every 1??year(s)  ???Satisfied?(in the past 1 year)  ??? ??Satisfied?  ??? ? ? ?ADL Screening on??03/20/20.??Satisfied by Beatriz Akbar  ??? ? ? ?Aspirin Therapy for CVD Prevention on??03/06/20.??Satisfied by Ju Mccoy RN  ??? ? ? ?Blood Pressure Screening on??05/13/20.??Satisfied by Diamond Brown  ??? ? ? ?Body Mass Index Check on??03/03/21.??Satisfied by Tori Denton L. L.  ??? ? ? ?Coronary Artery Disease Maintenance-Antiplatelet Agent Prescribed on??03/06/20.??Satisfied by Shikha Stringer NP  ??? ? ? ?Diabetes Maintenance-Serum Creatinine on??10/05/20.??Satisfied by Marcos Presley  ??? ? ? ?Diabetes Screening on??10/05/20.??Satisfied by Michelle Holt  ??? ? ? ?Fall Risk Assessment on??03/03/21.??Satisfied by Tori Denton L. L.  ??? ? ? ?Functional Assessment on??03/06/20.??Satisfied by Ju Mccoy RN  ??? ? ? ?Hypertension Management-BMP on??10/05/20.??Satisfied by Marcos Presley  ??? ? ? ?Influenza Vaccine on??03/03/21.??Satisfied by Tori Denton LPrasanth L.  ??? ? ? ?Lipid Screening on??10/05/20.??Satisfied by Marcos Presley  ??? ? ? ?Obesity Screening on??03/03/21.??Satisfied by Tori Denton L. LPrasanth  ?  Diagnostic Results  Knee radiographs show appropriate implant position

## 2023-10-17 DIAGNOSIS — M48.07 SPINAL STENOSIS, LUMBOSACRAL REGION: Primary | ICD-10-CM

## 2023-10-19 ENCOUNTER — TELEPHONE (OUTPATIENT)
Dept: NEUROSURGERY | Facility: CLINIC | Age: 78
End: 2023-10-19
Payer: MEDICARE

## 2023-10-19 DIAGNOSIS — M41.9 SCOLIOSIS OF LUMBAR SPINE, UNSPECIFIED SCOLIOSIS TYPE: Primary | ICD-10-CM

## 2023-10-20 NOTE — TELEPHONE ENCOUNTER
Patient is scheduled with Bety for 10/25 at 2:00pm and XR at [facility] for 1:00pm.

## 2023-10-20 NOTE — TELEPHONE ENCOUNTER
Please schedule the patient sooner rather than later on either my or Naye's schedule with AP/Lat/Flex/Ext views of the lumbar spine. I have entered the order. Thanks

## 2023-10-25 ENCOUNTER — OFFICE VISIT (OUTPATIENT)
Dept: NEUROSURGERY | Facility: CLINIC | Age: 78
End: 2023-10-25
Payer: MEDICARE

## 2023-10-25 ENCOUNTER — HOSPITAL ENCOUNTER (OUTPATIENT)
Dept: RADIOLOGY | Facility: HOSPITAL | Age: 78
Discharge: HOME | End: 2023-10-25
Attending: NURSE PRACTITIONER
Payer: MEDICARE

## 2023-10-25 VITALS
WEIGHT: 199 LBS | SYSTOLIC BLOOD PRESSURE: 133 MMHG | HEART RATE: 66 BPM | RESPIRATION RATE: 16 BRPM | HEIGHT: 63 IN | DIASTOLIC BLOOD PRESSURE: 80 MMHG | BODY MASS INDEX: 35.26 KG/M2

## 2023-10-25 DIAGNOSIS — M41.9 SCOLIOSIS, UNSPECIFIED SCOLIOSIS TYPE, UNSPECIFIED SPINAL REGION: ICD-10-CM

## 2023-10-25 DIAGNOSIS — M48.07 SPINAL STENOSIS, LUMBOSACRAL REGION: Primary | ICD-10-CM

## 2023-10-25 DIAGNOSIS — M41.9 SCOLIOSIS OF LUMBAR SPINE, UNSPECIFIED SCOLIOSIS TYPE: ICD-10-CM

## 2023-10-25 DIAGNOSIS — Z78.0 POSTMENOPAUSAL: ICD-10-CM

## 2023-10-25 PROCEDURE — 72110 X-RAY EXAM L-2 SPINE 4/>VWS: CPT | Mod: TC

## 2023-10-25 PROCEDURE — 99205 PR OFFICE/OUTPT VISIT, NEW, LEVL V, 60-74 MIN: ICD-10-PCS | Mod: ,,, | Performed by: NURSE PRACTITIONER

## 2023-10-25 PROCEDURE — 99205 OFFICE O/P NEW HI 60 MIN: CPT | Mod: ,,, | Performed by: NURSE PRACTITIONER

## 2023-10-25 RX ORDER — LISINOPRIL 10 MG/1
1 TABLET ORAL DAILY
COMMUNITY

## 2023-10-25 RX ORDER — DICLOFENAC SODIUM 75 MG/1
TABLET, DELAYED RELEASE ORAL 2 TIMES DAILY
COMMUNITY
End: 2023-10-25

## 2023-10-25 RX ORDER — ERGOCALCIFEROL 1.25 MG/1
1 CAPSULE ORAL
COMMUNITY

## 2023-10-25 RX ORDER — IPRATROPIUM BROMIDE 21 UG/1
2 SPRAY, METERED NASAL 2 TIMES DAILY
COMMUNITY

## 2023-10-25 RX ORDER — CETIRIZINE HYDROCHLORIDE 10 MG/1
10 TABLET ORAL DAILY
COMMUNITY
Start: 2023-09-02 | End: 2023-10-25

## 2023-10-25 RX ORDER — GLIMEPIRIDE 2 MG/1
TABLET ORAL DAILY
COMMUNITY
End: 2023-10-25

## 2023-10-25 RX ORDER — BACLOFEN 10 MG/1
TABLET ORAL 2 TIMES DAILY PRN
COMMUNITY
End: 2023-10-25

## 2023-10-25 RX ORDER — FLUTICASONE FUROATE AND VILANTEROL 100; 25 UG/1; UG/1
POWDER RESPIRATORY (INHALATION) DAILY
COMMUNITY
End: 2023-10-25

## 2023-10-25 RX ORDER — SERTRALINE HYDROCHLORIDE 25 MG/1
1 TABLET, FILM COATED ORAL DAILY
COMMUNITY

## 2023-10-25 RX ORDER — HYDROCODONE BITARTRATE AND ACETAMINOPHEN 7.5; 325 MG/1; MG/1
1 TABLET ORAL EVERY 12 HOURS PRN
COMMUNITY
End: 2023-10-25

## 2023-10-25 RX ORDER — ASPIRIN 81 MG/1
81 TABLET ORAL DAILY
COMMUNITY

## 2023-10-25 RX ORDER — LEVOTHYROXINE SODIUM 175 UG/1
1 TABLET ORAL EVERY MORNING
COMMUNITY

## 2023-10-25 RX ORDER — PANTOPRAZOLE SODIUM 40 MG/1
TABLET, DELAYED RELEASE ORAL DAILY
COMMUNITY

## 2023-10-25 RX ORDER — ALPRAZOLAM 0.5 MG/1
1 TABLET ORAL DAILY
COMMUNITY

## 2023-10-25 RX ORDER — TRAMADOL HYDROCHLORIDE 50 MG/1
50 TABLET ORAL 2 TIMES DAILY PRN
COMMUNITY
Start: 2023-10-24

## 2023-10-25 RX ORDER — CYCLOBENZAPRINE HCL 10 MG
1 TABLET ORAL 2 TIMES DAILY
COMMUNITY
End: 2023-10-25

## 2023-10-25 RX ORDER — MONTELUKAST SODIUM 10 MG/1
10 TABLET ORAL DAILY
COMMUNITY
Start: 2023-07-03 | End: 2023-10-25

## 2023-10-25 RX ORDER — FAMOTIDINE 40 MG/1
40 TABLET, FILM COATED ORAL NIGHTLY
COMMUNITY
Start: 2023-09-02 | End: 2023-10-25

## 2023-10-25 RX ORDER — HYDROCHLOROTHIAZIDE 25 MG/1
1 TABLET ORAL DAILY
COMMUNITY

## 2023-10-25 RX ORDER — ALBUTEROL SULFATE 90 UG/1
AEROSOL, METERED RESPIRATORY (INHALATION) EVERY 4 HOURS PRN
COMMUNITY

## 2023-10-25 NOTE — PROGRESS NOTES
[facility]  History & Physical  Neurosurgery      Flip Brenner   [id number]   1945       SUBJECTIVE:     CHIEF COMPLAINT:  Lower back and right leg pain    HPI:  Flip Brenner is a 78 y.o. female who presents for neurosurgical evaluation at the recommendation of Dr. Charly Boateng. The patient presents today describing bilateral lower back pain greater on the right radiating into the right posterior and lateral leg.  She states her pain is most intense into the right posterolateral calf.  With any type of standing or walking activity her symptoms will be aggravated.  Her symptoms do wake her from sleep at night when she changes position in bed.  Her daughter who has accompanied her today states about a year ago she was walking with a cane and her symptoms have progressed now to the point that she is requiring a rolling walker.  She does find some relief when she sits. The patient reports these symptoms have been present since 2021 and have progressively worsened.  She does also occasionally have some numbness into the right leg with fatigue.  The patient describes the pain in the lower back as aching with burning, numbness, aching and occasional stabbing into the right leg.  The patient states she also has previously experienced Charley horses into the right calf when sleeping.  The patient rates the pain 9 on the pain scale.  The patient reports despite physical therapy, chiropractic care as well as epidural steroid injections the symptoms have persisted.  She reports temporary relief with these modalities.  She feels her balance is slightly off at times.  The patient denies disturbances in bowel or bladder function.  The patient has a significant history of lumbar decompression with Dr. Nj on 11/10/2010.    Past Medical History:   Diagnosis Date    Anxiety disorder, unspecified     DM (diabetes mellitus)     HTN (hypertension)     Hypothyroidism, unspecified     Spinal stenosis,  lumbosacral region        Past Surgical History:   Procedure Laterality Date    APPENDECTOMY      CHOLECYSTECTOMY      HERNIA REPAIR      TOTAL KNEE ARTHROPLASTY Left 03/03/2020    TUBAL LIGATION         Family History   Problem Relation Age of Onset    Heart attack Mother     Asthma Mother     Coronary artery disease Mother     Thyroid disease Father        Social History     Socioeconomic History    Marital status:    Tobacco Use    Smoking status: Never    Smokeless tobacco: Never   Substance and Sexual Activity    Alcohol use: Not Currently    Drug use: Never        Review of patient's allergies indicates:   Allergen Reactions    Adhesive Hives    Codeine Rash     Other reaction(s): Not available    Sulfamethoxazole-trimethoprim Rash        Current Outpatient Medications   Medication Instructions    albuterol (PROVENTIL/VENTOLIN HFA) 90 mcg/actuation inhaler Every 4 hours PRN    ALPRAZolam (XANAX) 0.5 MG tablet 1 tablet, Oral, Daily    aspirin (ECOTRIN) 81 mg, Oral, Daily    ergocalciferol (ERGOCALCIFEROL) 50,000 unit Cap 1 capsule, Oral, Every 7 days    hydroCHLOROthiazide (HYDRODIURIL) 25 MG tablet 1 tablet, Oral, Daily    ipratropium (ATROVENT) 21 mcg (0.03 %) nasal spray 2 sprays, Each Nostril, 2 times daily    levothyroxine (SYNTHROID, LEVOTHROID) 175 MCG tablet 1 tablet, Oral, Every morning    lisinopriL 10 MG tablet 1 tablet, Oral, Daily    pantoprazole (PROTONIX) 40 MG tablet Daily    sertraline (ZOLOFT) 25 MG tablet 1 tablet, Oral, Daily    SITagliptin phosphate (JANUVIA) 100 MG Tab 1 tablet, Oral, Daily    traMADoL (ULTRAM) 50 mg, Oral, 2 times daily PRN          Review of Systems   Constitutional:  Negative for chills, fever and weight loss.   HENT:  Negative for congestion, hearing loss, nosebleeds and tinnitus.    Eyes:  Negative for blurred vision, double vision and photophobia.   Respiratory:  Negative for cough, shortness of breath and wheezing.    Cardiovascular:  Negative for chest pain,  "palpitations and leg swelling.   Gastrointestinal:  Negative for constipation, diarrhea, nausea and vomiting.   Genitourinary:  Negative for dysuria, frequency and urgency.   Musculoskeletal:  Positive for back pain. Negative for falls and neck pain.   Skin:  Negative for itching and rash.   Neurological:  Positive for tingling and weakness. Negative for dizziness, tremors, sensory change, speech change, seizures, loss of consciousness and headaches.   Psychiatric/Behavioral:  Negative for depression, hallucinations and memory loss. The patient is not nervous/anxious.        OBJECTIVE:     Visit Vitals  /80 (BP Location: Right arm, Patient Position: Sitting)   Pulse 66   Resp 16   Ht 5' 3" (1.6 m)   Wt 90.3 kg (199 lb)   BMI 35.25 kg/m²        Physical Exam    General:  Pleasant, Well-nourished, Well-groomed.    Cardiovascular:  Neck is supple.  There are no carotid bruits.  Heart has regular rate and rhythm.    Lungs:  Breathing is quiet, non-lablored    Abdomen:  Soft, non-tender, non-distended.    Neurological:  Muscle strength against resistance:   Right Left   Deltoid (C5) 5/5 5/5   Biceps (C5/6) 5/5 5/5   Wrist Flexors (C5/6) 5/5 5/5   Triceps (C7) 5/5 5/5   Wrist extension (C7) 5/5 5/5   Finger abduction (C8) 5/5 5/5    5/5 5/5        Hip abduction 5/5 5/5   Hip adduction 5/5 5/5   Hip flexion (L2) 5/5 5/5   Knee extension (L3) 5/5 5/5   Knee flexion (L4) 5/5 5/5   Dorsiflexion (L5) 5/5 5/5   EHL (L5) 5/5 5/5   Plantar flexion (S1) 5/5 5/5   Sensation is intact to primary modalities in bilateral upper and lower extremities.    Reflexes:   Right Left   Triceps (C7) 0 0   Biceps (C5) 2+ 2+   Brachioradialis (C6) 1+ 1+   Patellar (L4) 2+ 1+   Achilles (S1) 2+ 1+   Negative Babinski, Clonus, Multani, Tinel's, and Phalen's bilaterally.  Gait is  cautious with a rolling walker  Coordination is normal.  No tremor noted.    Imaging:  All pertinent neuroimaging independently reviewed. Discussed these " findings in detail with the patient.    MRI of the lumbar spine dated 10/13/2023 reveals degenerative scoliosis with moderate to severe degenerative disc disease throughout the lumbar spine with slight anterolisthesis at L4-5, vacuum disc deformity as well as disc extrusion with caudal extension causing severe canal stenosis as well as moderate to severe bilateral foraminal stenosis.  L5-S1 with severe disc space narrowing, vacuum disc deformity, posterior bulging as well as left greater than right foraminal stenosis.  ASSESSMENT:       ICD-10-CM ICD-9-CM   1. Spinal stenosis, lumbosacral region  M48.07 724.02   2. Scoliosis, unspecified scoliosis type, unspecified spinal region  M41.9 737.30   3. Postmenopausal  Z78.0 V49.81       PLAN:   1. Spinal stenosis, lumbosacral region  - Ambulatory referral/consult to Physical/Occupational Therapy; Future  - CT Lumbar Spine Without Contrast; Future  - Ambulatory referral/consult to Pain Clinic; Future    2. Scoliosis, unspecified scoliosis type, unspecified spinal region  - CT Lumbar Spine Without Contrast; Future    3. Postmenopausal  - DXA Bone Density Axial Skeleton 1 or more sites; Future    Flip Brenner presents today reporting chronic and progressive lower back and right lower extremity pain.  I did take the time to review the patient's recent x-ray and MRI with she and her daughter in clinic today.  At this time the patient is unsure if she would be willing to move forward with surgical intervention as she has some hesitancy due to her age.  Have encouraged her to returned to outpatient physical therapy for lumbar traction as she states this did provide her some temporary relief in the past.  We will obtain a bone density scan as well as a CT scan of the lumbar spine and have the patient return for a surgical consultation with Dr. Hilton to discuss her options in further detail.  She and her daughter were advised to notify us with any progression of symptoms  prior to further consultation.  This plan was discussed in detail with the patient and her daughter and they are agreement to move forward.    Follow up in about 8 weeks (around 12/20/2023) for With Imaging Prior to Appt, with Dr. Hilton.      E/M Level Based On Time:   15 minutes spent on reviewing chart, which includes interpreting lab results and diagnostic tests.   45 minutes spent in the room with the patient performing a history and physical exam, counseling or educating the patient/caregiver, prescribing medications, ordering labwork/diagnostic tests, or placing referrals.   5 minutes spent collaborating plan of care with physician.   5 minutes spent documenting all relevant clinical informationin the electronic health record.     Total Time Spent: 70 minutes       MISTI Aponte    Disclaimer:  This note is prepared using voice recognition software and as such is likely to have errors despite attempts at proofreading. Please contact me for questions.

## 2023-10-31 ENCOUNTER — TELEPHONE (OUTPATIENT)
Dept: NEUROSURGERY | Facility: CLINIC | Age: 78
End: 2023-10-31
Payer: MEDICARE

## 2023-11-02 NOTE — TELEPHONE ENCOUNTER
I spoke with the patient.  She was at the ER at [facility] due to neck pain and headaches.  The Tramadol was not helping so she went to the ER.  She was referred to Dr. Khan when she was seen on 10/25 by Bety.  She said they are waiting on records from us and that both she and Dr. Khan's office have been calling.  I do not see any record of that.  Please follow up on the referral to pain management and see what they need to get her scheduled.  Thanks

## 2023-11-03 NOTE — TELEPHONE ENCOUNTER
The patient's CT scan reveals severe multilevel degenerative disc disease with scoliosis and congenital canal narrowing.  She is scheduled for further consultation with Dr. Hilton at the beginning of December regarding possible surgical options.

## 2023-11-03 NOTE — TELEPHONE ENCOUNTER
I refaxed all of her records and referral again to Dr. Khan. She also completed her CT lumbar on 10/30/23 and it is now in Epic with the report.

## 2024-01-17 ENCOUNTER — OFFICE VISIT (OUTPATIENT)
Dept: NEUROSURGERY | Facility: CLINIC | Age: 79
End: 2024-01-17
Payer: MEDICARE

## 2024-01-17 VITALS
HEIGHT: 63 IN | BODY MASS INDEX: 35.26 KG/M2 | RESPIRATION RATE: 16 BRPM | DIASTOLIC BLOOD PRESSURE: 71 MMHG | HEART RATE: 48 BPM | SYSTOLIC BLOOD PRESSURE: 131 MMHG | WEIGHT: 199 LBS

## 2024-01-17 DIAGNOSIS — M48.07 SPINAL STENOSIS, LUMBOSACRAL REGION: Primary | ICD-10-CM

## 2024-01-17 DIAGNOSIS — M41.9 SCOLIOSIS OF LUMBAR SPINE, UNSPECIFIED SCOLIOSIS TYPE: ICD-10-CM

## 2024-01-17 PROCEDURE — 99214 OFFICE O/P EST MOD 30 MIN: CPT | Mod: ,,, | Performed by: STUDENT IN AN ORGANIZED HEALTH CARE EDUCATION/TRAINING PROGRAM

## 2024-01-17 RX ORDER — LINAGLIPTIN 5 MG/1
5 TABLET, FILM COATED ORAL
COMMUNITY
Start: 2023-11-06

## 2024-01-17 RX ORDER — GUAIFENESIN 600 MG/1
1 TABLET, EXTENDED RELEASE ORAL 2 TIMES DAILY
COMMUNITY

## 2024-01-17 RX ORDER — GLIMEPIRIDE 2 MG/1
2 TABLET ORAL EVERY MORNING
COMMUNITY
Start: 2023-12-28

## 2024-01-17 RX ORDER — BENAZEPRIL HYDROCHLORIDE 10 MG/1
10 TABLET ORAL
COMMUNITY
Start: 2023-11-06

## 2024-01-17 RX ORDER — TIZANIDINE 2 MG/1
2 TABLET ORAL
COMMUNITY
Start: 2024-01-10

## 2024-01-17 RX ORDER — GUAIFENESIN 1200 MG
650 TABLET, EXTENDED RELEASE 12 HR ORAL
COMMUNITY
Start: 2023-11-06

## 2024-01-17 RX ORDER — INSULIN LISPRO 100 [IU]/ML
INJECTION, SOLUTION INTRAVENOUS; SUBCUTANEOUS
COMMUNITY
Start: 2023-03-28

## 2024-01-17 RX ORDER — DICLOFENAC SODIUM 75 MG/1
75 TABLET, DELAYED RELEASE ORAL 2 TIMES DAILY
COMMUNITY

## 2024-01-17 RX ORDER — HYDROCODONE BITARTRATE AND ACETAMINOPHEN 7.5; 325 MG/1; MG/1
TABLET ORAL
COMMUNITY
Start: 2023-11-06

## 2024-01-17 NOTE — PROGRESS NOTES
[facility]  History & Physical  Neurosurgery      Flip Brenner   [id number]   1945       SUBJECTIVE:     CHIEF COMPLAINT:  Low back and right leg pain    HPI:  Flip Brennre is a 78 y.o. female who presents for follow up appointment.  She was last seen in our office on October 25th, 2023.  She continues to complain of pain across her lower back which radiates down the lateral aspect of her right leg.  Her pain is exacerbated by standing and walking.  She established care with Dr. Pagan from pain management and is scheduled to have an RFA.  Denies difficulties with bowel or bladder.    Past Medical History:   Diagnosis Date    Anxiety disorder, unspecified     DM (diabetes mellitus)     HTN (hypertension)     Hypothyroidism, unspecified     Spinal stenosis, lumbosacral region      Past Surgical History:   Procedure Laterality Date    APPENDECTOMY      CHOLECYSTECTOMY      HERNIA REPAIR      TOTAL KNEE ARTHROPLASTY Left 03/03/2020    TUBAL LIGATION       Family History   Problem Relation Age of Onset    Heart attack Mother     Asthma Mother     Coronary artery disease Mother     Thyroid disease Father      Social History     Socioeconomic History    Marital status:    Tobacco Use    Smoking status: Never    Smokeless tobacco: Never   Substance and Sexual Activity    Alcohol use: Not Currently    Drug use: Never     Review of patient's allergies indicates:   Allergen Reactions    Adhesive Hives    Sulfa (sulfonamide antibiotics)     Codeine Rash     Other reaction(s): Not available    Sulfamethoxazole-trimethoprim Rash        Current Outpatient Medications   Medication Instructions    acetaminophen (TYLENOL) 650 mg, Oral    albuterol (PROVENTIL/VENTOLIN HFA) 90 mcg/actuation inhaler Every 4 hours PRN    ALPRAZolam (XANAX) 0.5 MG tablet 1 tablet, Oral, Daily    aspirin (ECOTRIN) 81 mg, Oral, Daily    benazepriL (LOTENSIN) 10 mg, Oral    diclofenac (VOLTAREN) 75 mg, Oral, 2 times  "daily    ergocalciferol (ERGOCALCIFEROL) 50,000 unit Cap 1 capsule, Oral, Every 7 days    glimepiride (AMARYL) 2 mg, Oral, Every morning    guaiFENesin (MUCINEX) 600 mg 12 hr tablet 1 tablet, Oral, 2 times daily    hydroCHLOROthiazide (HYDRODIURIL) 25 MG tablet 1 tablet, Oral, Daily    HYDROcodone-acetaminophen (NORCO) 7.5-325 mg per tablet Oral    insulin lispro (HUMALOG KWIKPEN INSULIN) 100 unit/mL pen 3 units for every 50 pt over 150 on glucose check Subcutaneous 3x daily for 30 days  Max 20 units each admin    ipratropium (ATROVENT) 21 mcg (0.03 %) nasal spray 2 sprays, Each Nostril, 2 times daily    levothyroxine (SYNTHROID, LEVOTHROID) 175 MCG tablet 1 tablet, Oral, Every morning    lisinopriL 10 MG tablet 1 tablet, Oral, Daily    pantoprazole (PROTONIX) 40 MG tablet Daily    sertraline (ZOLOFT) 25 MG tablet 1 tablet, Oral, Daily    SITagliptin phosphate (JANUVIA) 100 MG Tab 1 tablet, Oral, Daily    tiZANidine (ZANAFLEX) 2 mg, Oral    TRADJENTA 5 mg, Oral    traMADoL (ULTRAM) 50 mg, Oral, 2 times daily PRN      Review of Systems  12 point review of systems conducted, negative except as stated in the history of present illness. See HPI for details.    OBJECTIVE:       Visit Vitals  /71   Pulse (!) 48   Resp 16   Ht 5' 3" (1.6 m)   Wt 90.3 kg (199 lb)   BMI 35.25 kg/m²      General:  Pleasant, Well-nourished, Well-groomed.    Neurological:  The patient is awake, alert, and oriented in all 4 spheres.  Muscle strength against resistance:  Strength  Deltoids Triceps Biceps Wrist Extension Wrist Flexion Intrinsics   Upper: R 5/5 5/5 5/5 5/5 5/5 5/5    L 5/5 5/5 5/5 5/5 5/5 5/5     Iliopsoas Quadriceps Knee  Flexion Tibialis  anterior Gastro- cnemius EHL   Lower: R 5/5 5/5 5/5 5/5 5/5 5/5    L 5/5 5/5 5/5 5/5 5/5 5/5   Sensation is intact to primary modalities in bilateral lower extremities.    Reflexes:   Right Left   Patellar 2+ 1+   Achilles 1+ 1+   Gait is antalgic with rolling walker  Coordination:  Within " normal limits    Imaging:  All pertinent neuroimaging independently reviewed. Discussed these findings in detail with the patient.    We reviewed her MRI of the lumbar spine from 2022 which demonstrates moderate-to-severe scoliosis.  Multilevel degenerative changes.  Evidence of L4-5 annular bulge causing severe central canal stenosis.    CT of her lumbar spine from October 2023 demonstrates multilevel degenerative changes with reversal of the normal lumbar lordosis.  Moderate stenosis at L2-3.  Significant central and lateral recess stenosis at L3-4 and L4-5.  Foraminal narrowing at multiple levels.    DIAGNOSES:       ICD-10-CM ICD-9-CM   1. Spinal stenosis, lumbosacral region  M48.07 724.02   2. Scoliosis of lumbar spine, unspecified scoliosis type  M41.9 737.30     ASSESSMENT/PLAN:     78-year-old female presented with worsening chronic low back pain radiating down her right side.  She is interested in continuing conservative management, awaiting RFA by pain management.  I have given her a new referral to Physical and Occupational therapy.  I will see her back in about 2 months to see how she is doing.  She knows to call anytime before her next visit with questions or concerns.    Follow up in about 2 months (around 3/17/2024) for with MD. Armando Hilton MD  Neurosurgery  [facility]     30 minutes were personally spent on this visit including my review of available records from referring physician, prior imaging, comprehensive physical and neurologic examination and discussion with the patient.     Disclaimer:  This note is prepared using voice recognition software and as such is likely to have errors despite attempts at proofreading.

## 2024-03-18 ENCOUNTER — OFFICE VISIT (OUTPATIENT)
Dept: NEUROSURGERY | Facility: CLINIC | Age: 79
End: 2024-03-18
Payer: MEDICARE

## 2024-03-18 VITALS
WEIGHT: 186 LBS | RESPIRATION RATE: 16 BRPM | BODY MASS INDEX: 32.96 KG/M2 | HEART RATE: 48 BPM | SYSTOLIC BLOOD PRESSURE: 147 MMHG | DIASTOLIC BLOOD PRESSURE: 82 MMHG | HEIGHT: 63 IN

## 2024-03-18 DIAGNOSIS — M48.07 SPINAL STENOSIS, LUMBOSACRAL REGION: Primary | ICD-10-CM

## 2024-03-18 DIAGNOSIS — M54.17 LUMBOSACRAL RADICULOPATHY: ICD-10-CM

## 2024-03-18 PROCEDURE — 99214 OFFICE O/P EST MOD 30 MIN: CPT | Mod: ,,, | Performed by: STUDENT IN AN ORGANIZED HEALTH CARE EDUCATION/TRAINING PROGRAM

## 2024-03-18 RX ORDER — DIPHENOXYLATE HYDROCHLORIDE AND ATROPINE SULFATE 2.5; .025 MG/1; MG/1
1 TABLET ORAL 4 TIMES DAILY PRN
COMMUNITY
Start: 2024-02-20

## 2024-03-18 RX ORDER — GABAPENTIN 300 MG/1
300 CAPSULE ORAL NIGHTLY
Qty: 90 CAPSULE | Refills: 2 | Status: SHIPPED | OUTPATIENT
Start: 2024-03-18 | End: 2024-12-13

## 2024-03-18 RX ORDER — LEVOTHYROXINE SODIUM 112 UG/1
112 TABLET ORAL EVERY MORNING
COMMUNITY
Start: 2024-02-27

## 2024-03-18 RX ORDER — CLINDAMYCIN HYDROCHLORIDE 300 MG/1
300 CAPSULE ORAL 3 TIMES DAILY
COMMUNITY
Start: 2024-03-06

## 2024-03-18 NOTE — PROGRESS NOTES
[facility]  History & Physical  Neurosurgery      Flip Brenner   [id number]   1945       SUBJECTIVE:     CHIEF COMPLAINT:  Low back and right leg pain    HPI:  Flip Brenner is a 78 y.o. female who presents for follow up appointment.  She is here today with her daughter.  She was last seen in the office on January 17th 2024.  Of low back pain that radiates down the lateral aspect of her right leg.  She takes tramadol and Tylenol which provide some relief.  She tried physical therapy which did not provide significant relief.  She is followed by Dr. Paagn from pain management and is awaiting RFA.  No issues with bowel or bladder.  She also complains of intermittent pain radiating down her arms and bilateral hand numbness.  An MRI of her cervical spine was obtained.  We have copy of the report but no imaging available for review.    Past Medical History:   Diagnosis Date    Anxiety disorder, unspecified     DM (diabetes mellitus)     HTN (hypertension)     Hypothyroidism, unspecified     Spinal stenosis, lumbosacral region        Past Surgical History:   Procedure Laterality Date    APPENDECTOMY      CHOLECYSTECTOMY      HERNIA REPAIR      TOTAL KNEE ARTHROPLASTY Left 03/03/2020    TUBAL LIGATION       Family History   Problem Relation Age of Onset    Heart attack Mother     Asthma Mother     Coronary artery disease Mother     Thyroid disease Father      Social History     Socioeconomic History    Marital status:    Tobacco Use    Smoking status: Never    Smokeless tobacco: Never   Substance and Sexual Activity    Alcohol use: Not Currently    Drug use: Never     Review of patient's allergies indicates:   Allergen Reactions    Adhesive Hives    Sulfa (sulfonamide antibiotics)     Codeine Rash     Other reaction(s): Not available    Sulfamethoxazole-trimethoprim Rash      Current Outpatient Medications   Medication Instructions    acetaminophen (TYLENOL) 650 mg, Oral    albuterol  "(PROVENTIL/VENTOLIN HFA) 90 mcg/actuation inhaler Every 4 hours PRN    ALPRAZolam (XANAX) 0.5 MG tablet 1 tablet, Oral, Daily    aspirin (ECOTRIN) 81 mg, Oral, Daily    benazepriL (LOTENSIN) 10 mg, Oral    clindamycin (CLEOCIN) 300 mg, Oral, 3 times daily    diclofenac (VOLTAREN) 75 mg, Oral, 2 times daily    diphenoxylate-atropine 2.5-0.025 mg (LOMOTIL) 2.5-0.025 mg per tablet 1 tablet, Oral, 4 times daily PRN    ergocalciferol (ERGOCALCIFEROL) 50,000 unit Cap 1 capsule, Oral, Every 7 days    gabapentin (NEURONTIN) 300 mg, Oral, Nightly    glimepiride (AMARYL) 2 mg, Oral, Every morning    guaiFENesin (MUCINEX) 600 mg 12 hr tablet 1 tablet, Oral, 2 times daily    hydroCHLOROthiazide (HYDRODIURIL) 25 MG tablet 1 tablet, Oral, Daily    HYDROcodone-acetaminophen (NORCO) 7.5-325 mg per tablet Oral    insulin lispro (HUMALOG KWIKPEN INSULIN) 100 unit/mL pen 3 units for every 50 pt over 150 on glucose check Subcutaneous 3x daily for 30 days  Max 20 units each admin    ipratropium (ATROVENT) 21 mcg (0.03 %) nasal spray 2 sprays, Each Nostril, 2 times daily    levothyroxine (SYNTHROID) 112 mcg, Oral, Every morning    levothyroxine (SYNTHROID, LEVOTHROID) 175 MCG tablet 1 tablet, Oral, Every morning    lisinopriL 10 MG tablet 1 tablet, Oral, Daily    pantoprazole (PROTONIX) 40 MG tablet Daily    sertraline (ZOLOFT) 25 MG tablet 1 tablet, Oral, Daily    SITagliptin phosphate (JANUVIA) 100 MG Tab 1 tablet, Oral, Daily    tiZANidine (ZANAFLEX) 2 mg, Oral    TRADJENTA 5 mg, Oral    traMADoL (ULTRAM) 50 mg, Oral, 2 times daily PRN      Review of Systems  12 point review of systems conducted, negative except as stated in the history of present illness. See HPI for details.    OBJECTIVE:     Visit Vitals  BP (!) 147/82   Pulse (!) 48   Resp 16   Ht 5' 3" (1.6 m)   Wt 84.4 kg (186 lb)   BMI 32.95 kg/m²      General:  Pleasant, Well-nourished, Well-groomed.     Neurological:  The patient is awake, alert, and oriented in all 4 " spheres.  Muscle strength against resistance:  Strength   Deltoids Triceps Biceps Wrist Extension Wrist Flexion Intrinsics   Upper: R 5/5 5/5 5/5 5/5 5/5 5/5     L 5/5 5/5 5/5 5/5 5/5 5/5       Iliopsoas Quadriceps Knee  Flexion Tibialis  anterior Gastro- cnemius EHL   Lower: R 5/5 5/5 5/5 5/5 5/5 5/5     L 5/5 5/5 5/5 5/5 5/5 5/5   Sensation is intact to primary modalities in bilateral lower extremities.     Reflexes:    Right Left   Patellar 2+ 1+   Achilles 1+ 1+   Gait is antalgic with rolling walker    Imaging:  No new imaging has been obtained.  We will work on obtaining images of her cervical spine.    ASSESSMENT:       ICD-10-CM ICD-9-CM   1. Spinal stenosis, lumbosacral region  M48.07 724.02   2. Lumbosacral radiculopathy  M54.17 724.4     PLAN:     Flip Brenner is a 78 year-old female with history of prior lumbar decompression by Dr. Nj in 2010 who presents with worsening low back and right leg pain.  Imaging demonstrates scoliosis and multilevel degenerative changes, worse at L4-5. She is scheduled to undergo a RFA in the next few weeks with Dr. Pagan. I will see her back in about three months to see how she responded to that procedure. I have also prescribed Gabapentin 300mg at night. If there is no improvement we could consider redo lumbar decompression and I briefly mention this would carry an increased risk given her age, co morbidities and prior surgery. We will work on obtaining the images of the MRI of her cervical spine which was obtained in [facility].    Other orders  -     gabapentin (NEURONTIN) 300 MG capsule; Take 1 capsule (300 mg total) by mouth every evening.  Dispense: 90 capsule; Refill: 2    Follow up in about 3 months (around 6/18/2024) for with MD, Follow-up.    Armando Hilton MD  Neurosurgery  [facility]     30 minutes were personally spent on this visit including my review of available records from referring physician, prior imaging, comprehensive physical  and neurologic examination and discussion with the patient.     Disclaimer:  This note is prepared using voice recognition software and as such is likely to have errors despite attempts at proofreading.